# Patient Record
Sex: FEMALE | Race: BLACK OR AFRICAN AMERICAN | NOT HISPANIC OR LATINO | Employment: FULL TIME | URBAN - METROPOLITAN AREA
[De-identification: names, ages, dates, MRNs, and addresses within clinical notes are randomized per-mention and may not be internally consistent; named-entity substitution may affect disease eponyms.]

---

## 2020-10-14 ENCOUNTER — HOSPITAL ENCOUNTER (OUTPATIENT)
Dept: LAB | Facility: MEDICAL CENTER | Age: 50
End: 2020-10-14
Attending: FAMILY MEDICINE
Payer: COMMERCIAL

## 2020-10-14 LAB
ALBUMIN SERPL BCP-MCNC: 4.2 G/DL (ref 3.2–4.9)
ALBUMIN/GLOB SERPL: 1.3 G/DL
ALP SERPL-CCNC: 57 U/L (ref 30–99)
ALT SERPL-CCNC: 17 U/L (ref 2–50)
ANION GAP SERPL CALC-SCNC: 10 MMOL/L (ref 7–16)
AST SERPL-CCNC: 19 U/L (ref 12–45)
BILIRUB SERPL-MCNC: 0.2 MG/DL (ref 0.1–1.5)
BUN SERPL-MCNC: 15 MG/DL (ref 8–22)
CALCIUM SERPL-MCNC: 9.8 MG/DL (ref 8.5–10.5)
CHLORIDE SERPL-SCNC: 102 MMOL/L (ref 96–112)
CHOLEST SERPL-MCNC: 203 MG/DL (ref 100–199)
CO2 SERPL-SCNC: 26 MMOL/L (ref 20–33)
CREAT SERPL-MCNC: 0.78 MG/DL (ref 0.5–1.4)
FASTING STATUS PATIENT QL REPORTED: NORMAL
GLOBULIN SER CALC-MCNC: 3.2 G/DL (ref 1.9–3.5)
GLUCOSE SERPL-MCNC: 102 MG/DL (ref 65–99)
HDLC SERPL-MCNC: 40 MG/DL
LDLC SERPL CALC-MCNC: 142 MG/DL
POTASSIUM SERPL-SCNC: 3.6 MMOL/L (ref 3.6–5.5)
PROT SERPL-MCNC: 7.4 G/DL (ref 6–8.2)
SODIUM SERPL-SCNC: 138 MMOL/L (ref 135–145)
TRIGL SERPL-MCNC: 106 MG/DL (ref 0–149)

## 2020-10-14 PROCEDURE — 36415 COLL VENOUS BLD VENIPUNCTURE: CPT

## 2020-10-14 PROCEDURE — 80053 COMPREHEN METABOLIC PANEL: CPT

## 2020-10-14 PROCEDURE — 80061 LIPID PANEL: CPT

## 2020-10-20 ENCOUNTER — HOSPITAL ENCOUNTER (OUTPATIENT)
Dept: RADIOLOGY | Facility: MEDICAL CENTER | Age: 50
End: 2020-10-20
Payer: COMMERCIAL

## 2020-10-29 ENCOUNTER — HOSPITAL ENCOUNTER (OUTPATIENT)
Dept: RADIOLOGY | Facility: MEDICAL CENTER | Age: 50
End: 2020-10-29
Attending: FAMILY MEDICINE
Payer: COMMERCIAL

## 2020-10-29 DIAGNOSIS — Z12.31 VISIT FOR SCREENING MAMMOGRAM: ICD-10-CM

## 2020-10-29 PROCEDURE — 77067 SCR MAMMO BI INCL CAD: CPT

## 2021-02-17 ENCOUNTER — OFFICE VISIT (OUTPATIENT)
Dept: MEDICAL GROUP | Facility: PHYSICIAN GROUP | Age: 51
End: 2021-02-17
Payer: COMMERCIAL

## 2021-02-17 VITALS
RESPIRATION RATE: 20 BRPM | HEIGHT: 72 IN | SYSTOLIC BLOOD PRESSURE: 130 MMHG | TEMPERATURE: 98.6 F | HEART RATE: 80 BPM | OXYGEN SATURATION: 98 % | WEIGHT: 274.5 LBS | DIASTOLIC BLOOD PRESSURE: 70 MMHG | BODY MASS INDEX: 37.18 KG/M2

## 2021-02-17 DIAGNOSIS — Z12.11 COLON CANCER SCREENING: ICD-10-CM

## 2021-02-17 DIAGNOSIS — R12 HEARTBURN: ICD-10-CM

## 2021-02-17 DIAGNOSIS — E66.9 OBESITY (BMI 35.0-39.9 WITHOUT COMORBIDITY): ICD-10-CM

## 2021-02-17 DIAGNOSIS — I10 ESSENTIAL HYPERTENSION: ICD-10-CM

## 2021-02-17 DIAGNOSIS — F32.A DEPRESSION, UNSPECIFIED DEPRESSION TYPE: ICD-10-CM

## 2021-02-17 PROCEDURE — 99203 OFFICE O/P NEW LOW 30 MIN: CPT | Performed by: NURSE PRACTITIONER

## 2021-02-17 RX ORDER — TELMISARTAN AND HYDROCHLORTHIAZIDE 40; 12.5 MG/1; MG/1
1 TABLET ORAL DAILY
COMMUNITY
End: 2021-12-15 | Stop reason: SDUPTHER

## 2021-02-17 RX ORDER — FLUOXETINE HYDROCHLORIDE 20 MG/1
20 CAPSULE ORAL DAILY
COMMUNITY
End: 2021-12-15 | Stop reason: SDUPTHER

## 2021-02-17 ASSESSMENT — PATIENT HEALTH QUESTIONNAIRE - PHQ9: CLINICAL INTERPRETATION OF PHQ2 SCORE: 0

## 2021-02-17 NOTE — PROGRESS NOTES
"Subjective  Chief Complaint  Establish care to manage her chronic conditions    History of Present Illness  Luis Paredes is a 50 y.o. female. This patient is here today to establish care.  Her prior PCP was Dr. Concha Babb with [facility].    Essential hypertension  Currently taking Telmisartan-Hydrochlorothiazide 40-12.5 mg daily as directed.   Reports diet is \"okay\".   She is not following a low-salt diet.  She is not exercising regularly.  She is not taking baby aspirin daily.   She is not monitoring BP at home.   Denies symptoms low BP: light-headed, tunnel-vision, unusual fatigue, dizziness.  Denies symptoms high BP: pounding headache, visual changes, palpitations, flushed face.   Denies chest pain, shortness of breath, dyspnea on exertion.   Denies medicine side effects: unusual fatigue, slow heartbeat, foot/leg swelling, cough.    Heartburn  Chronic and ongoing. Takes OTC Omeprazole daily. She has a problem with red sauce or salsa. If she eats it she will also have to take a TUMS to help with her heartburn.    Depression  Chronic and stable. Mom passed away in her sleep in 2013. Was started on Fluoxetine 20 mg. She states the medication is working well for her. She has no desire to stop taking the medication. Denies any thoughts of suicide or homicide.    Obesity (BMI 35.0-39.9 without comorbidity)  Chronic and ongoing. She states she knows she needs to work better on her diet and exercise. Current diet and exercise is okay.    Past Medical History    Allergies: Penicillins  Past Medical History:   Diagnosis Date   • Hypertension      Past Surgical History:   Procedure Laterality Date   • ABDOMINAL HYSTERECTOMY TOTAL  2010     Current Outpatient Medications Ordered in Epic   Medication Sig Dispense Refill   • telmisartan-hydrochlorothiazide (MICARDIS HCT) 40-12.5 MG per tablet Take 1 tablet by mouth every day.     • FLUoxetine (PROZAC) 20 MG Cap Take 20 mg by mouth every day.     • OMEPRAZOLE PO " "Take  by mouth.     • Evening Primrose Oil 1000 MG Cap Take  by mouth.     • Zinc 50 MG Cap Take 50 mg by mouth every day.     • BLACK ELDERBERRY PO Take  by mouth.     • Selenium 200 MCG Tab Take 200 mcg by mouth every day.       No current [facility]-ordered facility-administered medications on file.     Family History:    Family History   Problem Relation Age of Onset   • Heart Disease Mother         Heart Attack   • Cancer Paternal Grandfather         Pancreatic   • Diabetes Neg Hx    • Stroke Neg Hx       Personal/Social History:    Social History     Tobacco Use   • Smoking status: Never Smoker   • Smokeless tobacco: Never Used   Substance Use Topics   • Alcohol use: Yes     Comment: Socially    • Drug use: Never     Social History     Social History Narrative   • Not on file      Review of Systems:     General: Negative for fever/chills and unexpected weight change.    Eyes:  Negative for vision changes, eye pain.   ENT:  Negative for hearing changes, ear pain, congestion, sore throat, and neck pain.    Respiratory:  Negative for cough and dyspnea.     Cardiovascular:  Negative for chest pain and palpitations.   Gastrointestinal:  Negative for nausea/vomiting, changes in bowel habits, and abdominal pain.    Genitourinary:  Negative for dysuria and hematuria.    Musculoskeletal:  Negative for myalgias.    Skin:  Negative for rash.    Neurological:  Negative for numbness/tingling and headaches.    Heme/Lymph:  Does not bruise/bleed easily.     Objective  Physical Exam:   /70 (BP Location: Right arm, Patient Position: Sitting, BP Cuff Size: Large adult)   Pulse 80   Temp 37 °C (98.6 °F) (Temporal)   Resp 20   Ht 1.867 m (6' 1.5\")   Wt 125 kg (274 lb 8 oz)   SpO2 98%  Body mass index is 35.72 kg/m².  General:  Alert and oriented.  Well appearing.  NAD.  Head:  Normocephalic.   Eyes:  Eyes conjunctiva clear lids without ptosis, pupils equal and reactive to light accommodation.    ENT: Ears normal shape and " contour, canals are clear bilaterally, tympanic membranes are benign.  Neck: Supple without JVD. No lymphadenopathy.  Pulmonary:  Normal effort.  Clear to ausculation without rales, ronchi, or wheezing.  Cardiovascular:  Regular rate and rhythm without murmur, rubs or gallop.  Radial pulses are intact and equal bilaterally.  Gastrointestinal: Abdomen soft, nontender, nondistended. Normal bowel sounds.   Musculoskeletal:  No extremity cyanosis, clubbing, or edema.  Skin:  Warm and dry.  No obvious lesions.  Neurologic: Grossly intact. Sensation intact.   Psych: Normal mood and affect. Alert and oriented x3. Judgment and insight is normal.      Assessment/Plan   1. Essential hypertension  Chronic and ongoing.  Continue to take Telmisartan-Hydrochlorothiazide 40-12.5 mg daily.  Educated on a healthy diet and exercise.    2. Heartburn  Chronic and stable.  Continue to take OTC Omeprazole.  Educated to avoid foods that may trigger her heartburn.    3. Depression, unspecified depression type  Chronic and stable.  Continue to take Fluoxetine 20 mg daily.    4. Obesity (BMI 35.0-39.9 without comorbidity)  Chronic and ongoing.  Educated on a healthy diet and exercise.  Discussed the [facility] [employer] [facility], she will think about it and let me know if she would like a referral placed.    5. Colon cancer screening  - REFERRAL TO GI FOR COLONOSCOPY      Health Maintenance: Records Requested    Return in about 8 weeks (around 4/13/2021) for Pap.    I spent a total of 36 minutes with record review, exam, and communication with the patient, communication with other providers, and documentation of this encounter.    Please note that this dictation was created using voice recognition software. I have made every reasonable attempt to correct obvious errors, but I expect that there are errors of grammar and possibly content that I did not discover before finalizing the note.    SOBIA Giles  [facility]

## 2021-02-17 NOTE — ASSESSMENT & PLAN NOTE
"Currently taking Telmisartan-Hydrochlorothiazide 40-12.5 mg daily as directed.   Reports diet is \"okay\".   She is not following a low-salt diet.  She is not exercising regularly.  She is not taking baby aspirin daily.   She is not monitoring BP at home.   Denies symptoms low BP: light-headed, tunnel-vision, unusual fatigue, dizziness.  Denies symptoms high BP: pounding headache, visual changes, palpitations, flushed face.   Denies chest pain, shortness of breath, dyspnea on exertion.   Denies medicine side effects: unusual fatigue, slow heartbeat, foot/leg swelling, cough.  "

## 2021-02-17 NOTE — ASSESSMENT & PLAN NOTE
Chronic and ongoing. She states she knows she needs to work better on her diet and exercise. Current diet and exercise is okay.

## 2021-02-17 NOTE — ASSESSMENT & PLAN NOTE
Chronic and stable. Takes OTC Omeprazole daily. She has a problem with red sauce or salsa. If she eats it she will also have to take a TUMS to help with her heartburn.

## 2021-02-17 NOTE — ASSESSMENT & PLAN NOTE
Chronic and stable. Mom passed away in her sleep in 2013. Was started on Fluoxetine 20 mg. She states the medication is working well for her. She has no desire to stop taking the medication. Denies any thoughts of suicide or homicide.

## 2021-06-07 ENCOUNTER — APPOINTMENT (OUTPATIENT)
Dept: MEDICAL GROUP | Facility: PHYSICIAN GROUP | Age: 51
End: 2021-06-07
Payer: COMMERCIAL

## 2021-10-19 DIAGNOSIS — I10 ESSENTIAL HYPERTENSION: ICD-10-CM

## 2021-10-19 DIAGNOSIS — Z00.00 WELLNESS EXAMINATION: ICD-10-CM

## 2021-10-19 NOTE — PROGRESS NOTES
1. Wellness examination  - Comp Metabolic Panel; Future  - Lipid Profile; Future  - VITAMIN D,25 HYDROXY; Future  - TSH WITH REFLEX TO FT4; Future    2. Essential hypertension  - Comp Metabolic Panel; Future

## 2021-12-09 ENCOUNTER — HOSPITAL ENCOUNTER (OUTPATIENT)
Dept: LAB | Facility: MEDICAL CENTER | Age: 51
End: 2021-12-09
Attending: NURSE PRACTITIONER
Payer: COMMERCIAL

## 2021-12-09 DIAGNOSIS — Z00.00 WELLNESS EXAMINATION: ICD-10-CM

## 2021-12-09 DIAGNOSIS — I10 ESSENTIAL HYPERTENSION: ICD-10-CM

## 2021-12-09 LAB
ALBUMIN SERPL BCP-MCNC: 4.4 G/DL (ref 3.2–4.9)
ALBUMIN/GLOB SERPL: 1.3 G/DL
ALP SERPL-CCNC: 71 U/L (ref 30–99)
ALT SERPL-CCNC: 14 U/L (ref 2–50)
ANION GAP SERPL CALC-SCNC: 15 MMOL/L (ref 7–16)
AST SERPL-CCNC: 17 U/L (ref 12–45)
BILIRUB SERPL-MCNC: 0.3 MG/DL (ref 0.1–1.5)
BUN SERPL-MCNC: 8 MG/DL (ref 8–22)
CALCIUM SERPL-MCNC: 9.4 MG/DL (ref 8.5–10.5)
CHLORIDE SERPL-SCNC: 105 MMOL/L (ref 96–112)
CHOLEST SERPL-MCNC: 235 MG/DL (ref 100–199)
CO2 SERPL-SCNC: 20 MMOL/L (ref 20–33)
CREAT SERPL-MCNC: 0.65 MG/DL (ref 0.5–1.4)
FASTING STATUS PATIENT QL REPORTED: NORMAL
GLOBULIN SER CALC-MCNC: 3.3 G/DL (ref 1.9–3.5)
GLUCOSE SERPL-MCNC: 93 MG/DL (ref 65–99)
HDLC SERPL-MCNC: 44 MG/DL
LDLC SERPL CALC-MCNC: 174 MG/DL
POTASSIUM SERPL-SCNC: 3.7 MMOL/L (ref 3.6–5.5)
PROT SERPL-MCNC: 7.7 G/DL (ref 6–8.2)
SODIUM SERPL-SCNC: 140 MMOL/L (ref 135–145)
TRIGL SERPL-MCNC: 85 MG/DL (ref 0–149)
TSH SERPL DL<=0.005 MIU/L-ACNC: 2.06 UIU/ML (ref 0.38–5.33)

## 2021-12-09 PROCEDURE — 84443 ASSAY THYROID STIM HORMONE: CPT

## 2021-12-09 PROCEDURE — 36415 COLL VENOUS BLD VENIPUNCTURE: CPT

## 2021-12-09 PROCEDURE — 80053 COMPREHEN METABOLIC PANEL: CPT

## 2021-12-09 PROCEDURE — 82306 VITAMIN D 25 HYDROXY: CPT

## 2021-12-09 PROCEDURE — 80061 LIPID PANEL: CPT

## 2021-12-13 LAB — 25(OH)D3 SERPL-MCNC: 10 NG/ML (ref 30–80)

## 2021-12-15 ENCOUNTER — OFFICE VISIT (OUTPATIENT)
Dept: MEDICAL GROUP | Facility: PHYSICIAN GROUP | Age: 51
End: 2021-12-15
Payer: COMMERCIAL

## 2021-12-15 VITALS
HEIGHT: 72 IN | OXYGEN SATURATION: 97 % | SYSTOLIC BLOOD PRESSURE: 122 MMHG | DIASTOLIC BLOOD PRESSURE: 78 MMHG | TEMPERATURE: 97.3 F | BODY MASS INDEX: 37.93 KG/M2 | HEART RATE: 84 BPM | WEIGHT: 280 LBS | RESPIRATION RATE: 20 BRPM

## 2021-12-15 DIAGNOSIS — E78.00 ELEVATED LDL CHOLESTEROL LEVEL: ICD-10-CM

## 2021-12-15 DIAGNOSIS — I10 ESSENTIAL HYPERTENSION: ICD-10-CM

## 2021-12-15 DIAGNOSIS — E55.9 VITAMIN D DEFICIENCY: ICD-10-CM

## 2021-12-15 DIAGNOSIS — F32.A DEPRESSION, UNSPECIFIED DEPRESSION TYPE: ICD-10-CM

## 2021-12-15 DIAGNOSIS — Z12.31 ENCOUNTER FOR SCREENING MAMMOGRAM FOR BREAST CANCER: ICD-10-CM

## 2021-12-15 DIAGNOSIS — Z23 NEED FOR VACCINATION: ICD-10-CM

## 2021-12-15 PROCEDURE — 90686 IIV4 VACC NO PRSV 0.5 ML IM: CPT | Performed by: NURSE PRACTITIONER

## 2021-12-15 PROCEDURE — 99214 OFFICE O/P EST MOD 30 MIN: CPT | Mod: 25 | Performed by: NURSE PRACTITIONER

## 2021-12-15 PROCEDURE — 90471 IMMUNIZATION ADMIN: CPT | Performed by: NURSE PRACTITIONER

## 2021-12-15 RX ORDER — TELMISARTAN AND HYDROCHLORTHIAZIDE 40; 12.5 MG/1; MG/1
1 TABLET ORAL DAILY
Qty: 90 TABLET | Refills: 3 | Status: SHIPPED | OUTPATIENT
Start: 2021-12-15 | End: 2022-05-05 | Stop reason: SDUPTHER

## 2021-12-15 RX ORDER — ERGOCALCIFEROL 1.25 MG/1
50000 CAPSULE ORAL
Qty: 12 CAPSULE | Refills: 3 | Status: SHIPPED | OUTPATIENT
Start: 2021-12-15

## 2021-12-15 RX ORDER — FLUOXETINE HYDROCHLORIDE 20 MG/1
20 CAPSULE ORAL DAILY
Qty: 90 CAPSULE | Refills: 3 | Status: SHIPPED | OUTPATIENT
Start: 2021-12-15

## 2021-12-15 NOTE — PROGRESS NOTES
"Subjective  Chief Complaint  Lab Results and Medication Refill    History of Present Illness  Luis Paredes is a 51 y.o. female. This established patient is here today to review lab results and refill medications.    Depression  Chronic and stable. Currently taking Fluoxetine 20 mg daily. Denies any thoughts of suicide or homicide. She is requesting a refill of her medication at this time.    Essential hypertension  Currently taking Telmisartan-Hydrochlorothiazide 40-12.5 mg daily as directed.   Reports diet is \"okay\".   She is not following a low-salt diet.  She is not exercising regularly.  She is not taking baby aspirin daily.   She is not monitoring BP at home.   Denies symptoms low BP: light-headed, tunnel-vision, unusual fatigue, dizziness.  Denies symptoms high BP: pounding headache, visual changes, palpitations, flushed face.   Denies chest pain, shortness of breath, dyspnea on exertion.   Denies medicine side effects: unusual fatigue, slow heartbeat, foot/leg swelling, cough.    Vitamin D deficiency  New diagnosis.  Reports fatigue.  Denies any muscle weakness.  No history of CKD.  Reports having a good diet, including dairy products.  No history of IBD's, celiac, CF, or surgeries causing malabsorption.  Patient is obese.  Is not taking vitamin d and/or calcium supplement.     12/9/2021 11:23   25-Hydroxy   Vitamin D 25 10 (L)       Elevated LDL cholesterol level  Chronic, ongoing.  Not taking any cholesterol lowering medications.  10-year cardiac risk ASCVD score: 6.4%  Reports diet is \"okay\".   She is not following a low-cholesterol diet.  She is not exercising regularly.  She is not taking ASA daily.  She denies dizziness, claudication, or chest pain.  Due for updated lab work June 2022.     10/14/2020 08:20 12/9/2021 11:23   Cholesterol,Tot 203 (H) 235 (H)   Triglycerides 106 85   HDL 40 44    (H) 174 (H)     Past Medical History    Allergies: Penicillins  Past Medical History: "   Diagnosis Date   • Hypertension      Past Surgical History:   Procedure Laterality Date   • ABDOMINAL HYSTERECTOMY TOTAL  2010     Current Outpatient Medications Ordered in Epic   Medication Sig Dispense Refill   • vitamin D2, Ergocalciferol, (DRISDOL) 1.25 MG (08305 UT) Cap capsule Take 1 Capsule by mouth every 7 days. 12 Capsule 3   • telmisartan-hydrochlorothiazide (MICARDIS HCT) 40-12.5 MG per tablet Take 1 Tablet by mouth every day. 90 Tablet 3   • FLUoxetine (PROZAC) 20 MG Cap Take 1 Capsule by mouth every day. 90 Capsule 3   • SUPREP BOWEL PREP KIT 17.5-3.13-1.6 GM/177ML Solution      • OMEPRAZOLE PO Take  by mouth.     • Evening Primrose Oil 1000 MG Cap Take  by mouth.     • Zinc 50 MG Cap Take 50 mg by mouth every day.     • BLACK ELDERBERRY PO Take  by mouth.     • Selenium 200 MCG Tab Take 200 mcg by mouth every day.       No current [facility]-ordered facility-administered medications on file.     Family History:    Family History   Problem Relation Age of Onset   • Heart Disease Mother         Heart Attack   • Cancer Paternal Grandfather         Pancreatic   • Diabetes Neg Hx    • Stroke Neg Hx       Personal/Social History:    Social History     Tobacco Use   • Smoking status: Never Smoker   • Smokeless tobacco: Never Used   Vaping Use   • Vaping Use: Never used   Substance Use Topics   • Alcohol use: Yes     Comment: Socially    • Drug use: Never     Social History     Social History Narrative   • Not on file      Review of Systems:   General: Negative for fever/chills and unexpected weight change.   Respiratory:  Negative for cough and dyspnea.    Cardiovascular:  Negative for chest pain and palpitations.  Gastrointestinal:  Negative for nausea/vomiting, changes in bowel habits, and abdominal pain.   Skin:  Negative for rash.   Neurological:  Negative for numbness/tingling and headaches.     Objective  Physical Exam:   /78 (BP Location: Left arm, Patient Position: Sitting, BP Cuff Size: Large  "adult)   Pulse 84   Temp 36.3 °C (97.3 °F) (Temporal)   Resp 20   Ht 1.867 m (6' 1.5\")   Wt (!) 127 kg (280 lb)   SpO2 97%  Body mass index is 36.44 kg/m².  General:  Alert and oriented.  Well appearing.  NAD  Neck: Supple without JVD. No lymphadenopathy.  Pulmonary:  Normal effort.  Clear to ausculation without rales, ronchi, or wheezing.  Cardiovascular:  Regular rate and rhythm without murmur, rubs or gallop.   Skin:  Warm and dry.  No obvious lesions.  Musculoskeletal:  No extremity cyanosis, clubbing, or edema.      Diagnostic Testing/Findings:  Labs:    12/9/2021 11:23   Sodium 140   Potassium 3.7   Chloride 105   Co2 20   Anion Gap 15.0   Glucose 93   Bun 8   Creatinine 0.65   GFR If  >60   GFR If Non African American >60   Calcium 9.4   AST(SGOT) 17   ALT(SGPT) 14   Alkaline Phosphatase 71   Total Bilirubin 0.3   Albumin 4.4   Total Protein 7.7   Globulin 3.3   A-G Ratio 1.3   Fasting Status Fasting   Cholesterol,Tot 235 (H)   Triglycerides 85   HDL 44    (H)   25-Hydroxy   Vitamin D 25 10 (L)   TSH 2.060       Assessment/Plan  1. Vitamin D deficiency  New diagnosis.  Discussed Vitamin D 27703 units, take once a week.  Discussed taking Calcium and Magnesium supplement.  - vitamin D2, Ergocalciferol, (DRISDOL) 1.25 MG (10508 UT) Cap capsule; Take 1 Capsule by mouth every 7 days.  Dispense: 12 Capsule; Refill: 3    2. Elevated LDL cholesterol level  Chronic and ongoing.  Educated on a healthy diet and exercise.  Due for updated labs in 6 months.    3. Essential hypertension  Chronic and ongoing.  Continue to take Telmisartan-Hydrochlorothiazide 40-12.5 mg every day.  Educated on a healthy diet and exercise.  - telmisartan-hydrochlorothiazide (MICARDIS HCT) 40-12.5 MG per tablet; Take 1 Tablet by mouth every day.  Dispense: 90 Tablet; Refill: 3    4. Depression, unspecified depression type  Chronic and stable.  Continue to take Fluoxetine 20 mg daily.  - FLUoxetine (PROZAC) 20 MG " Cap; Take 1 Capsule by mouth every day.  Dispense: 90 Capsule; Refill: 3    5. Encounter for screening mammogram for breast cancer  - MA-SCREENING MAMMO BILAT W/TOMOSYNTHESIS W/CAD; Future    6. Need for vaccination  - INFLUENZA VACCINE QUAD INJ (PF)      Health Maintenance: Discussed with the patient.    Return in about 2 months (around 2/15/2022), or if symptoms worsen or fail to improve, for Pap.    I have placed the above orders and discussed them with an approved delegating provider.  The MA is performing the above orders under the direction of Dr. Jeffrey Prescott MD/DO.    Please note that this dictation was created using voice recognition software. I have made every reasonable attempt to correct obvious errors, but I expect that there are errors of grammar and possibly content that I did not discover before finalizing the note.    SOBIA Giles  [facility]

## 2021-12-15 NOTE — ASSESSMENT & PLAN NOTE
New diagnosis.  Reports fatigue.  Denies any muscle weakness.  No history of CKD.  Reports having a good diet, including dairy products.  No history of IBD's, celiac, CF, or surgeries causing malabsorption.  Patient is obese.  Is not taking vitamin d and/or calcium supplement.     12/9/2021 11:23   25-Hydroxy   Vitamin D 25 10 (L)

## 2021-12-15 NOTE — ASSESSMENT & PLAN NOTE
"Chronic, ongoing.  Not taking any cholesterol lowering medications.  10-year cardiac risk ASCVD score: 6.4%  Reports diet is \"okay\".   She is not following a low-cholesterol diet.  She is not exercising regularly.  She is not taking ASA daily.  She denies dizziness, claudication, or chest pain.  Due for updated lab work June 2022.     10/14/2020 08:20 12/9/2021 11:23   Cholesterol,Tot 203 (H) 235 (H)   Triglycerides 106 85   HDL 40 44    (H) 174 (H)     "

## 2021-12-15 NOTE — ASSESSMENT & PLAN NOTE
Chronic and stable. Currently taking Fluoxetine 20 mg daily. Denies any thoughts of suicide or homicide. She is requesting a refill of her medication at this time.

## 2022-01-24 ENCOUNTER — HOSPITAL ENCOUNTER (OUTPATIENT)
Dept: RADIOLOGY | Facility: MEDICAL CENTER | Age: 52
End: 2022-01-24
Attending: NURSE PRACTITIONER
Payer: COMMERCIAL

## 2022-01-24 DIAGNOSIS — Z12.31 ENCOUNTER FOR SCREENING MAMMOGRAM FOR BREAST CANCER: ICD-10-CM

## 2022-01-24 PROCEDURE — 77063 BREAST TOMOSYNTHESIS BI: CPT

## 2022-05-05 DIAGNOSIS — I10 ESSENTIAL HYPERTENSION: ICD-10-CM

## 2022-05-05 RX ORDER — TELMISARTAN AND HYDROCHLORTHIAZIDE 40; 12.5 MG/1; MG/1
2 TABLET ORAL DAILY
Qty: 180 TABLET | Refills: 2 | Status: SHIPPED | OUTPATIENT
Start: 2022-05-05

## 2022-05-05 NOTE — PROGRESS NOTES
1. Essential hypertension  - telmisartan-hydrochlorothiazide (MICARDIS HCT) 40-12.5 MG per tablet; Take 2 Tablets by mouth every day.  Dispense: 180 Tablet; Refill: 2

## 2022-05-20 ENCOUNTER — HOSPITAL ENCOUNTER (OUTPATIENT)
Dept: RADIOLOGY | Facility: MEDICAL CENTER | Age: 52
End: 2022-05-20
Attending: NURSE PRACTITIONER
Payer: COMMERCIAL

## 2022-05-20 ENCOUNTER — OFFICE VISIT (OUTPATIENT)
Dept: URGENT CARE | Facility: PHYSICIAN GROUP | Age: 52
End: 2022-05-20
Payer: COMMERCIAL

## 2022-05-20 VITALS
OXYGEN SATURATION: 98 % | SYSTOLIC BLOOD PRESSURE: 100 MMHG | HEART RATE: 73 BPM | HEIGHT: 72 IN | RESPIRATION RATE: 18 BRPM | WEIGHT: 283 LBS | TEMPERATURE: 97.6 F | BODY MASS INDEX: 38.33 KG/M2 | DIASTOLIC BLOOD PRESSURE: 64 MMHG

## 2022-05-20 DIAGNOSIS — S69.91XA INJURY OF RIGHT THUMB, INITIAL ENCOUNTER: ICD-10-CM

## 2022-05-20 DIAGNOSIS — S62.619A CLOSED AVULSION FRACTURE OF PROXIMAL PHALANX OF FINGER, INITIAL ENCOUNTER: ICD-10-CM

## 2022-05-20 PROCEDURE — 99213 OFFICE O/P EST LOW 20 MIN: CPT | Performed by: NURSE PRACTITIONER

## 2022-05-20 PROCEDURE — 73140 X-RAY EXAM OF FINGER(S): CPT | Mod: RT

## 2022-05-20 RX ORDER — PHENOL 1.4 %
AEROSOL, SPRAY (ML) MUCOUS MEMBRANE
COMMUNITY

## 2022-05-20 ASSESSMENT — ENCOUNTER SYMPTOMS
CHILLS: 0
MYALGIAS: 1
FEVER: 0
TINGLING: 0
BRUISES/BLEEDS EASILY: 0
FALLS: 1
SENSORY CHANGE: 0
WEAKNESS: 0

## 2022-05-20 NOTE — PROGRESS NOTES
"Subjective     Luis Paredes is a 52 y.o. female who presents with Fall (Pt sts she fell outside her yard and hurt her right thumb. Onset 2 weeks. )            HPI  States cleaning up yard 2 weeks ago, slipped on loose rock and fell on right hand. Right thumb swelling and \"catching\" of joint. No erythema, bruising or open wound. States thumb looks deformed. No numbness/tingling, weakness of hand  but unbale to close fist. Left handed.     PMH:  has a past medical history of Hypertension.  MEDS:   Current Outpatient Medications:   •  Calcium Carbonate (CALCIUM 600) 600 MG Tab, Take  by mouth., Disp: , Rfl:   •  Magnesium Gluconate (MAGNESIUM 27 PO), Take  by mouth., Disp: , Rfl:   •  telmisartan-hydrochlorothiazide (MICARDIS HCT) 40-12.5 MG per tablet, Take 2 Tablets by mouth every day., Disp: 180 Tablet, Rfl: 2  •  vitamin D2, Ergocalciferol, (DRISDOL) 1.25 MG (73778 UT) Cap capsule, Take 1 Capsule by mouth every 7 days., Disp: 12 Capsule, Rfl: 3  •  FLUoxetine (PROZAC) 20 MG Cap, Take 1 Capsule by mouth every day., Disp: 90 Capsule, Rfl: 3  •  OMEPRAZOLE PO, Take 15 mg by mouth., Disp: , Rfl:   •  Evening Primrose Oil 1000 MG Cap, Take  by mouth., Disp: , Rfl:   •  Zinc 50 MG Cap, Take 50 mg by mouth every day., Disp: , Rfl:   ALLERGIES:   Allergies   Allergen Reactions   • Penicillins      Was told she was allergic      SURGHX:   Past Surgical History:   Procedure Laterality Date   • ABDOMINAL HYSTERECTOMY TOTAL  2010     SOCHX:  reports that she has never smoked. She has never used smokeless tobacco. She reports current alcohol use. She reports that she does not use drugs.  FH: Family history was reviewed, no pertinent findings to report    Review of Systems   Constitutional: Negative for chills, fever and malaise/fatigue.   Musculoskeletal: Positive for falls, joint pain and myalgias.   Skin: Negative for itching and rash.   Neurological: Negative for tingling, sensory change and weakness. " "  Endo/Heme/Allergies: Does not bruise/bleed easily.   All other systems reviewed and are negative.             Objective     /64 (BP Location: Right arm, Patient Position: Sitting, BP Cuff Size: Large adult)   Pulse 73   Temp 36.4 °C (97.6 °F) (Temporal)   Resp 18   Ht 1.867 m (6' 1.5\")   Wt (!) 128 kg (283 lb)   LMP 02/01/2010   SpO2 98%   BMI 36.83 kg/m²      Physical Exam  Vitals reviewed.   Constitutional:       General: She is awake. She is not in acute distress.     Appearance: Normal appearance. She is well-developed. She is not ill-appearing, toxic-appearing or diaphoretic.   HENT:      Head: Normocephalic.   Cardiovascular:      Rate and Rhythm: Normal rate.   Pulmonary:      Effort: Pulmonary effort is normal.   Musculoskeletal:      Right hand: Swelling, deformity, tenderness and bony tenderness present. No lacerations. Decreased range of motion. Normal strength. Normal sensation. There is no disruption of two-point discrimination. Normal capillary refill. Normal pulse.      Comments: Mild swelling at base of right thumb, tenderness to touch along 1st and 2nd phalangeal joints, mild curvature at 1st phalangeal joint. Unable to close fist with thumb. Med assist applied finger splint.   Skin:     General: Skin is warm and dry.      Findings: Signs of injury present. No abrasion, bruising, ecchymosis, erythema, rash or wound.   Neurological:      Mental Status: She is alert and oriented to person, place, and time.   Psychiatric:         Attention and Perception: Attention normal.         Mood and Affect: Mood normal.         Speech: Speech normal.         Behavior: Behavior normal. Behavior is cooperative.                IMPRESSION:     1.  There is a bony density projecting adjacent to the trapezium and 1st CMC joint, possibly a subacute avulsion fracture. Alternatively this could represent an area of prior trauma with subsequent mild osteoarthritis of the 1st CMC joint.       "       Assessment & Plan        1. Injury of right thumb, initial encounter    - DX-FINGER(S) 2+ RIGHT; Future  - Referral to Sports Medicine    2. Closed avulsion fracture of proximal phalanx of finger, initial encounter    - Referral to Sports Medicine  -May use over the counter NSAID as needed for pain/swelling  -May use cool compresses for swelling as needed  -May utilize RICE method as needed, use finger splint until seen by Sports Med  -Avoid excessive weight bearing to avoid further injury  -May apply topical analgesics as needed   -Perform proper body mechanics with lift/carry, push/pull  -Monitor for deformity, numbness/tingling in toes, decreased range of motion with hand use- need re-evaluation  Follow up with Sports Med

## 2022-05-26 ENCOUNTER — OFFICE VISIT (OUTPATIENT)
Dept: SPORTS MEDICINE | Facility: CLINIC | Age: 52
End: 2022-05-26
Payer: COMMERCIAL

## 2022-05-26 VITALS
HEART RATE: 102 BPM | OXYGEN SATURATION: 97 % | BODY MASS INDEX: 38.33 KG/M2 | RESPIRATION RATE: 16 BRPM | SYSTOLIC BLOOD PRESSURE: 122 MMHG | TEMPERATURE: 98 F | WEIGHT: 283 LBS | DIASTOLIC BLOOD PRESSURE: 78 MMHG | HEIGHT: 72 IN

## 2022-05-26 DIAGNOSIS — M65.311 TRIGGER THUMB, RIGHT THUMB: ICD-10-CM

## 2022-05-26 PROCEDURE — 20550 NJX 1 TENDON SHEATH/LIGAMENT: CPT | Mod: RT | Performed by: FAMILY MEDICINE

## 2022-05-26 PROCEDURE — 99213 OFFICE O/P EST LOW 20 MIN: CPT | Mod: 25 | Performed by: FAMILY MEDICINE

## 2022-05-26 RX ORDER — TRIAMCINOLONE ACETONIDE 40 MG/ML
20 INJECTION, SUSPENSION INTRA-ARTICULAR; INTRAMUSCULAR ONCE
Status: COMPLETED | OUTPATIENT
Start: 2022-05-26 | End: 2022-05-26

## 2022-05-26 RX ADMIN — TRIAMCINOLONE ACETONIDE 20 MG: 40 INJECTION, SUSPENSION INTRA-ARTICULAR; INTRAMUSCULAR at 16:54

## 2022-05-26 ASSESSMENT — ENCOUNTER SYMPTOMS
NAUSEA: 0
DIZZINESS: 0
CHILLS: 0
SHORTNESS OF BREATH: 0
FEVER: 0
VOMITING: 0

## 2022-05-26 NOTE — PROCEDURES
PROCEDURE NOTE:  right HAND corticosteroid injection  Risks and benefits discussed  Informed consent obtained  Hand prepped in sterile fashion utilizing  20 mg of Kenalog and 0.5 cc of 2% lidocaine injected into the RIGHT thumb A1 pulley  Vapocoolant spray was utilized  Patient tolerated the procedure well  Postprocedure care and red flags discussed

## 2022-05-26 NOTE — PROGRESS NOTES
Chief Complaint   Patient presents with   • Finger Pain     Referral from UC/ R thumb pain      Subjective     Referred by ELLIOTT Sen  for evaluation of RIGHT thumb pain (left-handed dominant)  Date of injury, Friday, April 28, 2022 or possibly before that  RIGHT hand FOOSH at home outside of her house after sustaining an inversion injury of the ankle falling onto her right outstretched hand  Triggering started about 2 weeks after the fall (on May 13, 2022)  Her thumb was initially triggering painlessly, then eventually became more severe and more painful  Pain is sharp  Predominantly at the volar aspect of the hand in the region of the A1 pulley of the RIGHT thumb  No radiation  Improved with immobilization, temporarily because she feels like she needs to release it every now and then to move the finger  No night symptoms  Denies any prior injuries or issues with the RIGHT hand or wrist  Has tried Aleve helped initially  Aspercreme/topical pain medication also helped     at [facility], [employer] work and interacting with [employer] members  Activities include traveling    Review of Systems   Constitutional: Negative for chills and fever.   Respiratory: Negative for shortness of breath.    Cardiovascular: Negative for chest pain.   Gastrointestinal: Negative for nausea and vomiting.   Neurological: Negative for dizziness.     PMH:  has a past medical history of Hypertension.  MEDS:   Current Outpatient Medications:   •  Calcium Carbonate (CALCIUM 600) 600 MG Tab, Take  by mouth., Disp: , Rfl:   •  Magnesium Gluconate (MAGNESIUM 27 PO), Take  by mouth., Disp: , Rfl:   •  telmisartan-hydrochlorothiazide (MICARDIS HCT) 40-12.5 MG per tablet, Take 2 Tablets by mouth every day., Disp: 180 Tablet, Rfl: 2  •  vitamin D2, Ergocalciferol, (DRISDOL) 1.25 MG (44523 UT) Cap capsule, Take 1 Capsule by mouth every 7 days., Disp: 12 Capsule, Rfl: 3  •  FLUoxetine (PROZAC) 20 MG Cap, Take 1 Capsule by  "mouth every day., Disp: 90 Capsule, Rfl: 3  •  OMEPRAZOLE PO, Take 15 mg by mouth., Disp: , Rfl:   •  Evening Primrose Oil 1000 MG Cap, Take  by mouth., Disp: , Rfl:   •  Zinc 50 MG Cap, Take 50 mg by mouth every day., Disp: , Rfl:   ALLERGIES:   Allergies   Allergen Reactions   • Penicillins      Was told she was allergic      SURGHX:   Past Surgical History:   Procedure Laterality Date   • ABDOMINAL HYSTERECTOMY TOTAL  2010     SOCHX:  reports that she has never smoked. She has never used smokeless tobacco. She reports current alcohol use. She reports that she does not use drugs.  FH: Family history was reviewed, no pertinent findings to report    Objective   /78 (BP Location: Left arm, Patient Position: Sitting, BP Cuff Size: Large adult)   Pulse (!) 102   Temp 36.7 °C (98 °F) (Temporal)   Resp 16   Ht 1.867 m (6' 1.5\")   Wt (!) 128 kg (283 lb)   LMP 02/01/2010   SpO2 97%   BMI 36.83 kg/m²     Hand exam    NAD  Alert and oriented    BILATERAL WRIST exam  Range of motion intact  No tenderness along scaphoid, TFCC insertion, distal radius or distal ulna  Tinel's testing is NEGATIVE  The hand is otherwise neurovascularly intact    BILATERAL hand exam  NO swelling  NO deformity  POSITIVE triggering and tenderness along the A1 pulley of the RIGHT thumb  Range of motion of all MCP, DIP and PIP joints NORMAL  Pinky opposition NORMAL  Grind test is NEGATIVE  Collateral ligament testing is NORMAL    1. Trigger thumb, right thumb  triamcinolone acetonide (KENALOG-40) injection 20 mg     Date of injury, Friday, April 28, 2022 or possibly before that  RIGHT hand FOOSH at home outside of her house after sustaining an inversion injury of the ankle falling onto her right outstretched hand    To be clear, patient is NONTENDER at the region of the base of the thumb at the region of the CMC joint  Calcification found there is NONTENDER is likely from prior/remote injury    Her symptoms seem to be coming predominantly " from the RIGHT trigger thumb/A1 pulley region with tenderness at the A1 pulley    RIGHT thumb A1 pulley corticosteroid injection performed the office TODAY (May 26, 2022)     Return in about 4 weeks (around 6/23/2022).  To see how she is doing after RIGHT thumb A1 pulley corticosteroid injection          5/20/2022 3:22 PM     HISTORY/REASON FOR EXAM:  Fall 2 weeks ago with right thumb pain.        TECHNIQUE/EXAM DESCRIPTION AND NUMBER OF VIEWS:  3 views of the RIGHT fingers.     COMPARISON: None     FINDINGS:  There is a triangular-shaped bony density projecting adjacent to the radial side of the trapezium and 1st CMC joint.     There is also mild degenerative change of the right 1st CMC joint with slight radial subluxation and joint space narrowing with minimal sclerosis.     The 1st digit phalanges are intact.     Remainder of the right hand is unremarkable.     IMPRESSION:     1.  There is a bony density projecting adjacent to the trapezium and 1st CMC joint, possibly a subacute avulsion fracture. Alternatively this could represent an area of prior trauma with subsequent mild osteoarthritis of the 1st CMC joint.             Exam Ended: 05/20/22  3:31 PM Last Resulted: 05/20/22  3:36 PM           Interpreted in the office today with the patient     Thank you ELLIOTT Sen for allowing me to participate in caring for your patient.

## 2022-06-07 DIAGNOSIS — E78.00 ELEVATED LDL CHOLESTEROL LEVEL: ICD-10-CM

## 2022-06-23 ENCOUNTER — OFFICE VISIT (OUTPATIENT)
Dept: SPORTS MEDICINE | Facility: CLINIC | Age: 52
End: 2022-06-23
Payer: COMMERCIAL

## 2022-06-23 VITALS
HEART RATE: 72 BPM | DIASTOLIC BLOOD PRESSURE: 76 MMHG | RESPIRATION RATE: 18 BRPM | SYSTOLIC BLOOD PRESSURE: 122 MMHG | OXYGEN SATURATION: 96 % | TEMPERATURE: 98.4 F | BODY MASS INDEX: 38.33 KG/M2 | WEIGHT: 283 LBS | HEIGHT: 72 IN

## 2022-06-23 DIAGNOSIS — M65.311 TRIGGER THUMB, RIGHT THUMB: ICD-10-CM

## 2022-06-23 PROCEDURE — 99212 OFFICE O/P EST SF 10 MIN: CPT | Performed by: FAMILY MEDICINE

## 2022-06-23 NOTE — PROGRESS NOTES
"Chief Complaint   Patient presents with   • Finger Pain     F/V R thumb pain      Subjective     Referred by ELLIOTT Sen  for evaluation of RIGHT thumb pain (left-handed dominant)  Date of injury, Friday, April 28, 2022 or possibly before that  RIGHT hand FOOSH at home outside of her house after sustaining an inversion injury of the ankle falling onto her right outstretched hand  Triggering started about 2 weeks after the fall (on May 13, 2022)  Her thumb was initially triggering painlessly, then eventually became more severe and more painful  Pain is sharp  Predominantly at the volar aspect of the hand in the region of the A1 pulley of the RIGHT thumb  No radiation  Improved with immobilization, temporarily because she feels like she needs to release it every now and then to move the finger  No night symptoms  Denies any prior injuries or issues with the RIGHT hand or wrist  Has tried Aleve helped initially  Aspercreme/topical pain medication also helped     at [facility], 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 Trigger thumb, right thumb         Date of injury, Friday, April 28, 2022 or possibly before that  RIGHT hand FOOSH at home outside of her house after sustaining an inversion injury of the ankle falling onto her right outstretched hand    Her symptoms seem to be coming predominantly from the RIGHT trigger thumb/A1 pulley region with " tenderness at the A1 pulley    RIGHT thumb A1 pulley corticosteroid injection performed (May 26, 2022) HELPED     At this point, she is pleased with the results  She is still having some mild clinical triggering on exam with is painless    Follow-up as needed.  We can consider repeat corticosteroid injection of the RIGHT thumb A1 pulley if symptoms recur          5/20/2022 3:22 PM     HISTORY/REASON FOR EXAM:  Fall 2 weeks ago with right thumb pain.        TECHNIQUE/EXAM DESCRIPTION AND NUMBER OF VIEWS:  3 views of the RIGHT fingers.     COMPARISON: None     FINDINGS:  There is a triangular-shaped bony density projecting adjacent to the radial side of the trapezium and 1st CMC joint.     There is also mild degenerative change of the right 1st CMC joint with slight radial subluxation and joint space narrowing with minimal sclerosis.     The 1st digit phalanges are intact.     Remainder of the right hand is unremarkable.     IMPRESSION:     1.  There is a bony density projecting adjacent to the trapezium and 1st CMC joint, possibly a subacute avulsion fracture. Alternatively this could represent an area of prior trauma with subsequent mild osteoarthritis of the 1st CMC joint.             Exam Ended: 05/20/22  3:31 PM Last Resulted: 05/20/22  3:36 PM           Thank you NAIMA Sen. for allowing me to participate in caring for your patient.

## 2022-06-23 NOTE — Clinical Note
Chris Rivas, We saw Luis back for her trigger thumb.  Fortunately, the corticosteroid injection we provided for her trigger thumb HELPED. We will have her follow-up on an as-needed basis at this point.   Hope you are well! L

## 2022-08-19 ENCOUNTER — HOSPITAL ENCOUNTER (OUTPATIENT)
Facility: MEDICAL CENTER | Age: 52
End: 2022-08-19
Attending: OBSTETRICS & GYNECOLOGY
Payer: COMMERCIAL

## 2022-08-19 PROCEDURE — 87624 HPV HI-RISK TYP POOLED RSLT: CPT

## 2022-08-19 PROCEDURE — 88175 CYTOPATH C/V AUTO FLUID REDO: CPT

## 2022-08-24 LAB
CYTOLOGY REG CYTOL: NORMAL
HPV HR 12 DNA CVX QL NAA+PROBE: NEGATIVE
HPV16 DNA SPEC QL NAA+PROBE: NEGATIVE
HPV18 DNA SPEC QL NAA+PROBE: NEGATIVE
SPECIMEN SOURCE: NORMAL

## 2022-09-15 ENCOUNTER — HOSPITAL ENCOUNTER (OUTPATIENT)
Dept: LAB | Facility: MEDICAL CENTER | Age: 52
End: 2022-09-15
Attending: FAMILY MEDICINE
Payer: COMMERCIAL

## 2022-09-15 LAB
25(OH)D3 SERPL-MCNC: 42 NG/ML (ref 30–100)
ALBUMIN SERPL BCP-MCNC: 4.1 G/DL (ref 3.2–4.9)
ALBUMIN/GLOB SERPL: 1.4 G/DL
ALP SERPL-CCNC: 61 U/L (ref 30–99)
ALT SERPL-CCNC: 12 U/L (ref 2–50)
ANION GAP SERPL CALC-SCNC: 10 MMOL/L (ref 7–16)
AST SERPL-CCNC: 15 U/L (ref 12–45)
BASOPHILS # BLD AUTO: 1 % (ref 0–1.8)
BASOPHILS # BLD: 0.06 K/UL (ref 0–0.12)
BILIRUB SERPL-MCNC: 0.2 MG/DL (ref 0.1–1.5)
BUN SERPL-MCNC: 12 MG/DL (ref 8–22)
CALCIUM SERPL-MCNC: 9.6 MG/DL (ref 8.5–10.5)
CHLORIDE SERPL-SCNC: 103 MMOL/L (ref 96–112)
CHOLEST SERPL-MCNC: 192 MG/DL (ref 100–199)
CO2 SERPL-SCNC: 25 MMOL/L (ref 20–33)
CREAT SERPL-MCNC: 0.66 MG/DL (ref 0.5–1.4)
EOSINOPHIL # BLD AUTO: 0.13 K/UL (ref 0–0.51)
EOSINOPHIL NFR BLD: 2.3 % (ref 0–6.9)
ERYTHROCYTE [DISTWIDTH] IN BLOOD BY AUTOMATED COUNT: 45.8 FL (ref 35.9–50)
EST. AVERAGE GLUCOSE BLD GHB EST-MCNC: 126 MG/DL
FASTING STATUS PATIENT QL REPORTED: NORMAL
GFR SERPLBLD CREATININE-BSD FMLA CKD-EPI: 105 ML/MIN/1.73 M 2
GLOBULIN SER CALC-MCNC: 2.9 G/DL (ref 1.9–3.5)
GLUCOSE SERPL-MCNC: 107 MG/DL (ref 65–99)
HBA1C MFR BLD: 6 % (ref 4–5.6)
HCT VFR BLD AUTO: 39.6 % (ref 37–47)
HDLC SERPL-MCNC: 40 MG/DL
HGB BLD-MCNC: 13 G/DL (ref 12–16)
IMM GRANULOCYTES # BLD AUTO: 0.02 K/UL (ref 0–0.11)
IMM GRANULOCYTES NFR BLD AUTO: 0.3 % (ref 0–0.9)
LDLC SERPL CALC-MCNC: 126 MG/DL
LYMPHOCYTES # BLD AUTO: 2.51 K/UL (ref 1–4.8)
LYMPHOCYTES NFR BLD: 43.8 % (ref 22–41)
MCH RBC QN AUTO: 28.3 PG (ref 27–33)
MCHC RBC AUTO-ENTMCNC: 32.8 G/DL (ref 33.6–35)
MCV RBC AUTO: 86.3 FL (ref 81.4–97.8)
MONOCYTES # BLD AUTO: 0.38 K/UL (ref 0–0.85)
MONOCYTES NFR BLD AUTO: 6.6 % (ref 0–13.4)
NEUTROPHILS # BLD AUTO: 2.63 K/UL (ref 2–7.15)
NEUTROPHILS NFR BLD: 46 % (ref 44–72)
NRBC # BLD AUTO: 0 K/UL
NRBC BLD-RTO: 0 /100 WBC
PLATELET # BLD AUTO: 266 K/UL (ref 164–446)
PMV BLD AUTO: 12.5 FL (ref 9–12.9)
POTASSIUM SERPL-SCNC: 3.7 MMOL/L (ref 3.6–5.5)
PROT SERPL-MCNC: 7 G/DL (ref 6–8.2)
RBC # BLD AUTO: 4.59 M/UL (ref 4.2–5.4)
SODIUM SERPL-SCNC: 138 MMOL/L (ref 135–145)
TRIGL SERPL-MCNC: 129 MG/DL (ref 0–149)
WBC # BLD AUTO: 5.7 K/UL (ref 4.8–10.8)

## 2022-09-15 PROCEDURE — 85025 COMPLETE CBC W/AUTO DIFF WBC: CPT

## 2022-09-15 PROCEDURE — 80061 LIPID PANEL: CPT

## 2022-09-15 PROCEDURE — 83036 HEMOGLOBIN GLYCOSYLATED A1C: CPT

## 2022-09-15 PROCEDURE — 36415 COLL VENOUS BLD VENIPUNCTURE: CPT

## 2022-09-15 PROCEDURE — 82306 VITAMIN D 25 HYDROXY: CPT

## 2022-09-15 PROCEDURE — 80053 COMPREHEN METABOLIC PANEL: CPT

## 2023-02-23 ENCOUNTER — HOSPITAL ENCOUNTER (OUTPATIENT)
Dept: RADIOLOGY | Facility: MEDICAL CENTER | Age: 53
End: 2023-02-23
Attending: FAMILY MEDICINE
Payer: COMMERCIAL

## 2023-02-23 DIAGNOSIS — Z12.31 VISIT FOR SCREENING MAMMOGRAM: ICD-10-CM

## 2023-02-23 PROCEDURE — 77063 BREAST TOMOSYNTHESIS BI: CPT

## 2023-04-17 ENCOUNTER — DOCUMENTATION (OUTPATIENT)
Dept: HEALTH INFORMATION MANAGEMENT | Facility: OTHER | Age: 53
End: 2023-04-17
Payer: COMMERCIAL

## 2023-07-28 ENCOUNTER — HOSPITAL ENCOUNTER (OUTPATIENT)
Dept: LAB | Facility: MEDICAL CENTER | Age: 53
End: 2023-07-28
Attending: FAMILY MEDICINE
Payer: COMMERCIAL

## 2023-07-28 LAB
ALBUMIN SERPL BCP-MCNC: 4.1 G/DL (ref 3.2–4.9)
ALBUMIN/GLOB SERPL: 1.2 G/DL
ALP SERPL-CCNC: 57 U/L (ref 30–99)
ALT SERPL-CCNC: 13 U/L (ref 2–50)
ANION GAP SERPL CALC-SCNC: 9 MMOL/L (ref 7–16)
AST SERPL-CCNC: 14 U/L (ref 12–45)
BASOPHILS # BLD AUTO: 0.6 % (ref 0–1.8)
BASOPHILS # BLD: 0.03 K/UL (ref 0–0.12)
BILIRUB SERPL-MCNC: 0.3 MG/DL (ref 0.1–1.5)
BUN SERPL-MCNC: 13 MG/DL (ref 8–22)
CALCIUM ALBUM COR SERPL-MCNC: 9.2 MG/DL (ref 8.5–10.5)
CALCIUM SERPL-MCNC: 9.3 MG/DL (ref 8.5–10.5)
CHLORIDE SERPL-SCNC: 103 MMOL/L (ref 96–112)
CHOLEST SERPL-MCNC: 195 MG/DL (ref 100–199)
CO2 SERPL-SCNC: 26 MMOL/L (ref 20–33)
CREAT SERPL-MCNC: 0.72 MG/DL (ref 0.5–1.4)
EOSINOPHIL # BLD AUTO: 0.12 K/UL (ref 0–0.51)
EOSINOPHIL NFR BLD: 2.3 % (ref 0–6.9)
ERYTHROCYTE [DISTWIDTH] IN BLOOD BY AUTOMATED COUNT: 44.2 FL (ref 35.9–50)
EST. AVERAGE GLUCOSE BLD GHB EST-MCNC: 134 MG/DL
FASTING STATUS PATIENT QL REPORTED: NORMAL
GFR SERPLBLD CREATININE-BSD FMLA CKD-EPI: 99 ML/MIN/1.73 M 2
GLOBULIN SER CALC-MCNC: 3.3 G/DL (ref 1.9–3.5)
GLUCOSE SERPL-MCNC: 109 MG/DL (ref 65–99)
HBA1C MFR BLD: 6.3 % (ref 4–5.6)
HCT VFR BLD AUTO: 39.5 % (ref 37–47)
HDLC SERPL-MCNC: 36 MG/DL
HGB BLD-MCNC: 12.8 G/DL (ref 12–16)
IMM GRANULOCYTES # BLD AUTO: 0.01 K/UL (ref 0–0.11)
IMM GRANULOCYTES NFR BLD AUTO: 0.2 % (ref 0–0.9)
LDLC SERPL CALC-MCNC: 136 MG/DL
LYMPHOCYTES # BLD AUTO: 2.6 K/UL (ref 1–4.8)
LYMPHOCYTES NFR BLD: 50 % (ref 22–41)
MCH RBC QN AUTO: 28 PG (ref 27–33)
MCHC RBC AUTO-ENTMCNC: 32.4 G/DL (ref 32.2–35.5)
MCV RBC AUTO: 86.4 FL (ref 81.4–97.8)
MONOCYTES # BLD AUTO: 0.37 K/UL (ref 0–0.85)
MONOCYTES NFR BLD AUTO: 7.1 % (ref 0–13.4)
NEUTROPHILS # BLD AUTO: 2.07 K/UL (ref 1.82–7.42)
NEUTROPHILS NFR BLD: 39.8 % (ref 44–72)
NRBC # BLD AUTO: 0 K/UL
NRBC BLD-RTO: 0 /100 WBC (ref 0–0.2)
PLATELET # BLD AUTO: 271 K/UL (ref 164–446)
PMV BLD AUTO: 11.9 FL (ref 9–12.9)
POTASSIUM SERPL-SCNC: 3.7 MMOL/L (ref 3.6–5.5)
PROT SERPL-MCNC: 7.4 G/DL (ref 6–8.2)
RBC # BLD AUTO: 4.57 M/UL (ref 4.2–5.4)
SODIUM SERPL-SCNC: 138 MMOL/L (ref 135–145)
TRIGL SERPL-MCNC: 116 MG/DL (ref 0–149)
WBC # BLD AUTO: 5.2 K/UL (ref 4.8–10.8)

## 2023-07-28 PROCEDURE — 83036 HEMOGLOBIN GLYCOSYLATED A1C: CPT

## 2023-07-28 PROCEDURE — 80061 LIPID PANEL: CPT

## 2023-07-28 PROCEDURE — 80053 COMPREHEN METABOLIC PANEL: CPT

## 2023-07-28 PROCEDURE — 36415 COLL VENOUS BLD VENIPUNCTURE: CPT

## 2023-07-28 PROCEDURE — 85025 COMPLETE CBC W/AUTO DIFF WBC: CPT

## 2024-02-27 ENCOUNTER — HOSPITAL ENCOUNTER (OUTPATIENT)
Dept: RADIOLOGY | Facility: MEDICAL CENTER | Age: 54
End: 2024-02-27
Attending: FAMILY MEDICINE
Payer: COMMERCIAL

## 2024-02-27 DIAGNOSIS — Z12.31 VISIT FOR SCREENING MAMMOGRAM: ICD-10-CM

## 2024-02-27 PROCEDURE — 77067 SCR MAMMO BI INCL CAD: CPT

## 2024-08-13 ASSESSMENT — PATIENT HEALTH QUESTIONNAIRE - PHQ9
2. FEELING DOWN, DEPRESSED, IRRITABLE, OR HOPELESS: NOT AT ALL
1. LITTLE INTEREST OR PLEASURE IN DOING THINGS: NOT AT ALL

## 2024-08-13 ASSESSMENT — ACTIVITIES OF DAILY LIVING (ADL): BATHING_REQUIRES_ASSISTANCE: 0

## 2024-08-13 ASSESSMENT — ENCOUNTER SYMPTOMS: GENERAL WELL-BEING: GOOD

## 2024-08-14 ENCOUNTER — OFFICE VISIT (OUTPATIENT)
Dept: FAMILY PLANNING/WOMEN'S HEALTH CLINIC | Facility: PHYSICIAN GROUP | Age: 54
End: 2024-08-14
Attending: FAMILY MEDICINE
Payer: COMMERCIAL

## 2024-08-14 VITALS
BODY MASS INDEX: 38.6 KG/M2 | SYSTOLIC BLOOD PRESSURE: 120 MMHG | DIASTOLIC BLOOD PRESSURE: 82 MMHG | WEIGHT: 285 LBS | HEIGHT: 72 IN

## 2024-08-14 DIAGNOSIS — I10 ESSENTIAL HYPERTENSION: ICD-10-CM

## 2024-08-14 DIAGNOSIS — E78.00 ELEVATED LDL CHOLESTEROL LEVEL: ICD-10-CM

## 2024-08-14 DIAGNOSIS — E66.01 SEVERE OBESITY (BMI 35.0-39.9) WITH COMORBIDITY (HCC): ICD-10-CM

## 2024-08-14 DIAGNOSIS — F32.A DEPRESSION, UNSPECIFIED DEPRESSION TYPE: ICD-10-CM

## 2024-08-14 DIAGNOSIS — R12 HEARTBURN: ICD-10-CM

## 2024-08-14 PROCEDURE — 3074F SYST BP LT 130 MM HG: CPT

## 2024-08-14 PROCEDURE — 3079F DIAST BP 80-89 MM HG: CPT

## 2024-08-14 PROCEDURE — 1125F AMNT PAIN NOTED PAIN PRSNT: CPT

## 2024-08-14 PROCEDURE — G0439 PPPS, SUBSEQ VISIT: HCPCS

## 2024-08-14 RX ORDER — HYDROCHLOROTHIAZIDE 25 MG/1
25 TABLET ORAL DAILY
COMMUNITY

## 2024-08-14 SDOH — ECONOMIC STABILITY: FOOD INSECURITY: WITHIN THE PAST 12 MONTHS, YOU WORRIED THAT YOUR FOOD WOULD RUN OUT BEFORE YOU GOT MONEY TO BUY MORE.: NEVER TRUE

## 2024-08-14 SDOH — ECONOMIC STABILITY: HOUSING INSECURITY: IN THE PAST 12 MONTHS, HOW MANY TIMES HAVE YOU MOVED WHERE YOU WERE LIVING?: 1

## 2024-08-14 SDOH — ECONOMIC STABILITY: HOUSING INSECURITY: AT ANY TIME IN THE PAST 12 MONTHS, WERE YOU HOMELESS OR LIVING IN A SHELTER (INCLUDING NOW)?: NO

## 2024-08-14 SDOH — ECONOMIC STABILITY: INCOME INSECURITY: IN THE LAST 12 MONTHS, WAS THERE A TIME WHEN YOU WERE NOT ABLE TO PAY THE MORTGAGE OR RENT ON TIME?: NO

## 2024-08-14 SDOH — ECONOMIC STABILITY: FOOD INSECURITY: WITHIN THE PAST 12 MONTHS, THE FOOD YOU BOUGHT JUST DIDN'T LAST AND YOU DIDN'T HAVE MONEY TO GET MORE.: NEVER TRUE

## 2024-08-14 SDOH — ECONOMIC STABILITY: INCOME INSECURITY: HOW HARD IS IT FOR YOU TO PAY FOR THE VERY BASICS LIKE FOOD, HOUSING, MEDICAL CARE, AND HEATING?: NOT HARD AT ALL

## 2024-08-14 SDOH — ECONOMIC STABILITY: TRANSPORTATION INSECURITY
IN THE PAST 12 MONTHS, HAS THE LACK OF TRANSPORTATION KEPT YOU FROM MEDICAL APPOINTMENTS OR FROM GETTING MEDICATIONS?: NO

## 2024-08-14 SDOH — ECONOMIC STABILITY: TRANSPORTATION INSECURITY
IN THE PAST 12 MONTHS, HAS LACK OF TRANSPORTATION KEPT YOU FROM MEETINGS, WORK, OR FROM GETTING THINGS NEEDED FOR DAILY LIVING?: NO

## 2024-08-14 ASSESSMENT — PATIENT HEALTH QUESTIONNAIRE - PHQ9: CLINICAL INTERPRETATION OF PHQ2 SCORE: 0

## 2024-08-14 ASSESSMENT — PAIN SCALES - GENERAL: PAINLEVEL: 1=MINIMAL PAIN

## 2024-08-14 ASSESSMENT — FIBROSIS 4 INDEX: FIB4 SCORE: 0.79

## 2024-08-14 NOTE — ASSESSMENT & PLAN NOTE
Chronic, stable.  The patient tries to get up regularly.  Discussed eating a diet that contains many vegetables, fruits, and whole grains; includes low-fat dairy products, poultry, fish, beans, non-tropical vegetable oils and nuts; and limit intake of sweets, sugar-sweetened drinks and red meats.  Discussed increasing activity such as walking as tolerated.    Follow-up at least annually.

## 2024-08-14 NOTE — ASSESSMENT & PLAN NOTE
Chronic, stable. Continue with current defined treatment plan: vitamin D2, Ergocalciferol, (DRISDOL) 1.25 MG (13346 UT) Cap capsule. Follow-up at least annually.

## 2024-08-14 NOTE — PROGRESS NOTES
Comprehensive Health Assessment Program     Luis Paredes is a 54 y.o. here for her comprehensive health assessment.    Patient Active Problem List    Diagnosis Date Noted    Vitamin D deficiency 12/15/2021    Elevated LDL cholesterol level 12/15/2021    Essential hypertension 02/17/2021    Heartburn 02/17/2021    Depression 02/17/2021    Obesity (BMI 35.0-39.9 without comorbidity) 02/17/2021       Current Outpatient Medications   Medication Sig Dispense Refill    hydroCHLOROthiazide 25 MG Tab Take 25 mg by mouth every day.      Calcium Carbonate (CALCIUM 600) 600 MG Tab Take  by mouth.      Magnesium Gluconate (MAGNESIUM 27 PO) Take  by mouth.      telmisartan-hydrochlorothiazide (MICARDIS HCT) 40-12.5 MG per tablet Take 2 Tablets by mouth every day. 180 Tablet 2    FLUoxetine (PROZAC) 20 MG Cap Take 1 Capsule by mouth every day. 90 Capsule 3    OMEPRAZOLE PO Take 15 mg by mouth.      Evening Primrose Oil 1000 MG Cap Take  by mouth.      vitamin D2, Ergocalciferol, (DRISDOL) 1.25 MG (26573 UT) Cap capsule Take 1 Capsule by mouth every 7 days. 12 Capsule 3    Zinc 50 MG Cap Take 50 mg by mouth every day.       No current facility-administered medications for this visit.          Current supplements as per medication list.     Allergies:   Penicillins  Social History     Tobacco Use    Smoking status: Never    Smokeless tobacco: Never   Vaping Use    Vaping status: Never Used   Substance Use Topics    Alcohol use: Yes     Comment: Socially     Drug use: Never     Family History   Problem Relation Age of Onset    Heart Disease Mother         Heart Attack    Cancer Paternal Grandfather         Pancreatic    Diabetes Neg Hx     Stroke Neg Hx      Luis  has a past medical history of Hypertension.   Past Surgical History:   Procedure Laterality Date    ABDOMINAL HYSTERECTOMY TOTAL  2010       Screening:  In the last six months have you experienced any leakage of urine? Yes    Depression  Screening  Little interest or pleasure in doing things?  0 - not at all  Feeling down, depressed , or hopeless? 0 - not at all  Patient Health Questionnaire Score: 0     If depressive symptoms identified deferred to follow up visit unless specifically addressed in assessment and plan.    Interpretation of PHQ-9 Total Score   Score Severity   1-4 No Depression   5-9 Mild Depression   10-14 Moderate Depression   15-19 Moderately Severe Depression   20-27 Severe Depression    Screening for Cognitive Impairment  Do you or any of your friends or family members have any concern about your memory? No  Three Minute Recall (Leader, Season, Table)  /3    Kahlil clock face with all 12 numbers and set the hands to show 10 minutes after 11.       Cognitive concerns identified deferred for follow up unless specifically addressed in assessment and plan.    Fall Risk Assessment  Has the patient had two or more falls in the last year or any fall with injury in the last year?  No    Safety Assessment  Do you always wear your seatbelt?  Yes  Any changes to home needed to function safely? No  Difficulty hearing.  No  Patient counseled about all safety risks that were identified.    Functional Assessment ADLs  Are there any barriers preventing you from cooking for yourself or meeting nutritional needs?  No.    Are there any barriers preventing you from driving safely or obtaining transportation?  No.    Are there any barriers preventing you from using a telephone or calling for help?  No    Are there any barriers preventing you from shopping?  No.    Are there any barriers preventing you from taking care of your own finances?  No    Are there any barriers preventing you from managing your medications?  No    Are there any barriers preventing you from showering, bathing or dressing yourself? No    Are there any barriers preventing you from doing housework or laundry? No  Are there any barriers preventing you from using the toilet?No  Are  you currently engaging in any exercise or physical activity?  No.      Self-Assessment of Health  What is your perception of your health? Good    Do you sleep more than six hours a night? Yes    In the past 7 days, how much did pain keep you from doing your normal work? None    Do you spend quality time with family or friends (virtually or in person)? Yes    Do you usually eat a heart healthy diet that constists of a variety of fruits, vegetables, whole grains and fiber? Yes    Do you eat foods high in fat and/or Fast Food more than three times per week? No    How concerned are you that your medical conditions are not being well managed? Not at all    Are you worried that in the next 2 months, you may not have stable housing that you own, rent, or stay in as part of a household? No      Advance Care Planning  Do you have an Advance Directive, Living Will, Durable Power of , or POLST? No                 Health Maintenance Summary            Overdue - HIV Screening (Once) Never done      No completion history exists for this topic.              Overdue - Hepatitis C Screening (Once) Never done      No completion history exists for this topic.              Overdue - Polio Vaccine (Inactivated Polio) (2 of 3 - 4-dose series) Overdue since 10/10/1973      09/12/1973  Imm Admin: OPV TRIVALENT - HISTORICAL DATA (GIVEN PRIOR TO MAY 2016)              Overdue - Hepatitis B Vaccine (Hep B) (3 of 3 - 19+ 3-dose series) Overdue since 7/19/2016 02/22/2016  Imm Admin: Hepatitis B Vaccine (Adol/Adult)    01/19/2016  Imm Admin: Hepatitis B Vaccine (Adol/Adult)              Overdue - Colorectal Cancer Screening (Colonoscopy - Every 3 Years) Order placed this encounter      04/09/2021  REFERRAL TO GI FOR COLONOSCOPY              Influenza Vaccine (1) Next due on 9/1/2024 11/26/2023  Outside Immunization: Influenza Quad Inj P    10/21/2022  Patient Reported Immunization: Influenza, Unspecified - HISTORICAL DATA     12/15/2021  Imm Admin: Influenza Vaccine Quad Inj (Pf)    10/23/2020  Imm Admin: Influenza Vac Subunit Quad Inj (Pf)    10/16/2019  Imm Admin: Influenza Vaccine Quad Inj (Pf)    Only the first 5 history entries have been loaded, but more history exists.              Mammogram (Every 2 Years) Tentatively due on 2/27/2026 02/27/2024  MA-SCREENING MAMMO BILAT W/TOMOSYNTHESIS W/CAD    02/23/2023  MA-SCREENING MAMMO BILAT W/TOMOSYNTHESIS W/CAD    01/24/2022  MA-SCREENING MAMMO BILAT W/TOMOSYNTHESIS W/CAD    10/29/2020  MA-SCREENING MAMMO BILAT W/TOMOSYNTHESIS W/CAD              IMM DTaP/Tdap/Td Vaccine (3 - Td or Tdap) Next due on 12/27/2033 12/27/2023  Outside Immunization: Tdap    09/12/1974  Imm Admin: Dtap Vaccine              COVID-19 Vaccine (Series Information) Completed      12/27/2023  Imm Admin: Comirnaty (Covid-19 Vaccine, Mrna, [id number] Formula)    09/09/2021  Imm Admin: PFIZER PURPLE CAP SARS-COV-2 VACCINATION (12+)    04/26/2021  Imm Admin: PFIZER PURPLE CAP SARS-COV-2 VACCINATION (12+)    04/05/2021  Imm Admin: PFIZER PURPLE CAP SARS-COV-2 VACCINATION (12+)              Zoster (Shingles) Vaccines (Series Information) Completed      05/22/2024  Outside Immunization: Zoster Toni (Shingrix)    11/26/2023  Outside Immunization: Zoster Toni (Shingrix)              Hepatitis A Vaccine (Hep A) (Series Information) Aged Out      No completion history exists for this topic.              HPV Vaccines (Series Information) Aged Out      No completion history exists for this topic.              Meningococcal Immunization (Series Information) Aged Out      No completion history exists for this topic.              Pneumococcal Vaccine: 0-64 Years (Series Information) Aged Out      No completion history exists for this topic.              Discontinued - Cervical Cancer Screening  Discontinued        Frequency changed to Never automatically (Topic No Longer Applies)    08/19/2022  THINPREP PAP WITH HPV     "08/19/2022  Pathology Gynecology Specimen                    Patient Care Team:  Leatha Guzman M.D. as PCP - General (Family Medicine)      Financial Resource Strain: Low Risk  (8/14/2024)    Overall Financial Resource Strain (CARDIA)     Difficulty of Paying Living Expenses: Not hard at all      Transportation Needs: No Transportation Needs (8/14/2024)    PRAPARE - Transportation     Lack of Transportation (Medical): No     Lack of Transportation (Non-Medical): No      Food Insecurity: No Food Insecurity (8/14/2024)    Hunger Vital Sign     Worried About Running Out of Food in the Last Year: Never true     Ran Out of Food in the Last Year: Never true        Encounter Vitals  Blood Pressure: 120/82  O2 Delivery Device: None - Room Air  Weight: (!) 129 kg (285 lb)  Height: 186.7 cm (6' 1.5\")  BMI (Calculated): 37.09  Pain Score: 1=Minimal Pain  DME  O2 Delivery Device: None - Room Air     Alert, oriented in no acute distress.  Eye contact is good, speech goal directed, affect calm.    Assessment and Plan. The following treatment and monitoring plan is recommended:  No problem-specific Assessment & Plan notes found for this encounter.      Services suggested: No services needed at this time  Health Care Screening: Age-appropriate preventive services recommended by USPTF and ACIP covered by Medicare were discussed today. Services ordered if indicated and agreed upon by the patient.  Referrals offered: Community-based lifestyle interventions to reduce health risks and promote self-management and wellness, fall prevention, nutrition, physical activity, tobacco-use cessation, weight loss, and mental health services as per orders if indicated.    Discussion today about general wellness and lifestyle habits:    Prevent falls and reduce trip hazards; Cautioned about securing or removing rugs.  Have a working fire alarm and carbon monoxide detector.  Engage in regular physical activity and social activities.    Follow-up: " No follow-ups on file.

## 2024-08-14 NOTE — ASSESSMENT & PLAN NOTE
Chronic, stable.  PHQ-9 score today is 0. The patient reports good mood most of the time.  She has good family and J[employer] support. She denies SI/HI. Not currently in counseling.   Continue with current defined treatment plan: FLUoxetine (PROZAC) 20 MG Cap. Follow-up at least annually.

## 2024-08-14 NOTE — ASSESSMENT & PLAN NOTE
Chronic, stable. No current treatment. Discussed heart healthy diet and exercise.  Follow-up at least annually.

## 2024-08-14 NOTE — PROGRESS NOTES
Comprehensive Health Assessment Program     Luis Paredes is a 54 y.o. here for her comprehensive health assessment.    Patient Active Problem List    Diagnosis Date Noted    Vitamin D deficiency 12/15/2021    Elevated LDL cholesterol level 12/15/2021    Essential hypertension 02/17/2021    Heartburn 02/17/2021    Depression 02/17/2021    Severe obesity (BMI 35.0-39.9) with comorbidity (HCC) 02/17/2021       Current Outpatient Medications   Medication Sig Dispense Refill    hydroCHLOROthiazide 25 MG Tab Take 25 mg by mouth every day.      TELMISARTAN PO Take  by mouth.      Calcium Carbonate (CALCIUM 600) 600 MG Tab Take  by mouth.      Magnesium Gluconate (MAGNESIUM 27 PO) Take  by mouth.      FLUoxetine (PROZAC) 20 MG Cap Take 1 Capsule by mouth every day. 90 Capsule 3    OMEPRAZOLE PO Take 15 mg by mouth.      Evening Primrose Oil 1000 MG Cap Take  by mouth.      telmisartan-hydrochlorothiazide (MICARDIS HCT) 40-12.5 MG per tablet Take 2 Tablets by mouth every day. (Patient not taking: Reported on 8/14/2024) 180 Tablet 2    vitamin D2, Ergocalciferol, (DRISDOL) 1.25 MG (73862 UT) Cap capsule Take 1 Capsule by mouth every 7 days. 12 Capsule 3     No current facility-administered medications for this visit.          Current supplements as per medication list.     Allergies:   Penicillins  Social History     Tobacco Use    Smoking status: Never    Smokeless tobacco: Never   Vaping Use    Vaping status: Never Used   Substance Use Topics    Alcohol use: Yes     Comment: Socially     Drug use: Never     Family History   Problem Relation Age of Onset    Heart Disease Mother         Heart Attack    Cancer Paternal Grandfather         Pancreatic    Diabetes Neg Hx     Stroke Neg Hx      Luis  has a past medical history of Hypertension.   Past Surgical History:   Procedure Laterality Date    ABDOMINAL HYSTERECTOMY TOTAL  2010         Depression Screening  Little interest or pleasure in doing things?   0 - not at all  Feeling down, depressed , or hopeless? 0 - not at all  Patient Health Questionnaire Score: 0     If depressive symptoms identified deferred to follow up visit unless specifically addressed in assessment and plan.    Interpretation of PHQ-9 Total Score   Score Severity   1-4 No Depression   5-9 Mild Depression   10-14 Moderate Depression   15-19 Moderately Severe Depression   20-27 Severe Depression      Fall Risk Assessment  Has the patient had two or more falls in the last year or any fall with injury in the last year?  No    Safety Assessment  Do you always wear your seatbelt?  Yes  Any changes to home needed to function safely? No  Difficulty hearing.  No  Patient counseled about all safety risks that were identified.    Functional Assessment ADLs  Are there any barriers preventing you from cooking for yourself or meeting nutritional needs?  No.    Are there any barriers preventing you from driving safely or obtaining transportation?  No.    Are there any barriers preventing you from using a telephone or calling for help?  No    Are there any barriers preventing you from shopping?  No.    Are there any barriers preventing you from taking care of your own finances?  No    Are there any barriers preventing you from managing your medications?  No    Are there any barriers preventing you from showering, bathing or dressing yourself? No    Are there any barriers preventing you from doing housework or laundry? No  Are there any barriers preventing you from using the toilet?No  Are you currently engaging in any exercise or physical activity?  No.      Self-Assessment of Health  What is your perception of your health? Good    Do you sleep more than six hours a night? Yes    In the past 7 days, how much did pain keep you from doing your normal work? None    Do you spend quality time with family or friends (virtually or in person)? Yes    Do you usually eat a heart healthy diet that constists of a variety  of fruits, vegetables, whole grains and fiber? Yes    Do you eat foods high in fat and/or Fast Food more than three times per week? No    How concerned are you that your medical conditions are not being well managed? Not at all    Are you worried that in the next 2 months, you may not have stable housing that you own, rent, or stay in as part of a household? No      Advance Care Planning  Do you have an Advance Directive, Living Will, Durable Power of , or POLST? No                 Health Maintenance Summary            Overdue - HIV Screening (Once) Never done      No completion history exists for this topic.              Overdue - Hepatitis C Screening (Once) Never done      No completion history exists for this topic.              Overdue - Polio Vaccine (Inactivated Polio) (2 of 3 - 4-dose series) Overdue since 10/10/1973      09/12/1973  Imm Admin: OPV TRIVALENT - HISTORICAL DATA (GIVEN PRIOR TO MAY 2016)              Overdue - Hepatitis B Vaccine (Hep B) (3 of 3 - 19+ 3-dose series) Overdue since 7/19/2016 02/22/2016  Imm Admin: Hepatitis B Vaccine (Adol/Adult)    01/19/2016  Imm Admin: Hepatitis B Vaccine (Adol/Adult)              Overdue - Colorectal Cancer Screening (Colonoscopy - Every 3 Years) Overdue since 4/9/2024 04/09/2021  REFERRAL TO GI FOR COLONOSCOPY              Influenza Vaccine (1) Next due on 9/1/2024 11/26/2023  Outside Immunization: Influenza Quad Inj P    10/21/2022  Patient Reported Immunization: Influenza, Unspecified - HISTORICAL DATA    12/15/2021  Imm Admin: Influenza Vaccine Quad Inj (Pf)    10/23/2020  Imm Admin: Influenza Vac Subunit Quad Inj (Pf)    10/16/2019  Imm Admin: Influenza Vaccine Quad Inj (Pf)    Only the first 5 history entries have been loaded, but more history exists.              Mammogram (Every 2 Years) Tentatively due on 2/27/2026 02/27/2024  MA-SCREENING MAMMO BILAT W/TOMOSYNTHESIS W/CAD    02/23/2023  MA-SCREENING MAMMO BILAT  W/TOMOSYNTHESIS W/CAD    01/24/2022  MA-SCREENING MAMMO BILAT W/TOMOSYNTHESIS W/CAD    10/29/2020  MA-SCREENING MAMMO BILAT W/TOMOSYNTHESIS W/CAD              IMM DTaP/Tdap/Td Vaccine (3 - Td or Tdap) Next due on 12/27/2033 12/27/2023  Outside Immunization: Tdap    09/12/1973  Imm Admin: Dtap Vaccine              COVID-19 Vaccine (Series Information) Completed      12/27/2023  Imm Admin: Comirnaty (Covid-19 Vaccine, Mrna, 1585-1027 Formula)    09/09/2021  Imm Admin: PFIZER PURPLE CAP SARS-COV-2 VACCINATION (12+)    04/26/2021  Imm Admin: PFIZER PURPLE CAP SARS-COV-2 VACCINATION (12+)    04/05/2021  Imm Admin: PFIZER PURPLE CAP SARS-COV-2 VACCINATION (12+)              Zoster (Shingles) Vaccines (Series Information) Completed      05/22/2024  Outside Immunization: Zoster Toni (Shingrix)    11/26/2023  Outside Immunization: Zoster Toni (Shingrix)              Hepatitis A Vaccine (Hep A) (Series Information) Aged Out      No completion history exists for this topic.              HPV Vaccines (Series Information) Aged Out      No completion history exists for this topic.              Meningococcal Immunization (Series Information) Aged Out      No completion history exists for this topic.              Pneumococcal Vaccine: 0-64 Years (Series Information) Aged Out      No completion history exists for this topic.              Discontinued - Cervical Cancer Screening  Discontinued        Frequency changed to Never automatically (Topic No Longer Applies)    08/19/2022  THINPREP PAP WITH HPV    08/19/2022  Pathology Gynecology Specimen                    Patient Care Team:  Leatha Guzman M.D. as PCP - General (Family Medicine)      Financial Resource Strain: Low Risk  (8/14/2024)    Overall Financial Resource Strain (CARDIA)     Difficulty of Paying Living Expenses: Not hard at all      Transportation Needs: No Transportation Needs (8/14/2024)    PRAPARE - Transportation     Lack of Transportation (Medical): No      "Lack of Transportation (Non-Medical): No      Food Insecurity: No Food Insecurity (8/14/2024)    Hunger Vital Sign     Worried About Running Out of Food in the Last Year: Never true     Ran Out of Food in the Last Year: Never true     Intimate Partner Violence: Not on file         Encounter Vitals  Blood Pressure: 120/82  O2 Delivery Device: None - Room Air  Weight: (!) 129 kg (285 lb)  Height: 186.7 cm (6' 1.5\")  BMI (Calculated): 37.09  Pain Score: 1=Minimal Pain  DME  O2 Delivery Device: None - Room Air     Alert, oriented in no acute distress.  Eye contact is good, speech goal directed, affect calm.    Assessment and Plan. The following treatment and monitoring plan is recommended:  Essential hypertension  Chronic, stable. The patient's blood pressure is well controlled with current medication. Continue with current defined treatment plan: TELMISARTAN PO, hydroCHLOROthiazide 25 MG Tab. Follow-up at least annually.      Heartburn  Chronic, stable. The patient reports that her symptoms are well controlled with current medication. Continue with current defined treatment plan: Omeprazole (PRILOSEC PO) . Follow-up at least annually.      Elevated LDL cholesterol level  Chronic, stable. No current treatment. Discussed heart healthy diet and exercise.  Follow-up at least annually.      Severe obesity (BMI 35.0-39.9) with comorbidity (HCC)  Chronic, stable.  The patient tries to get up regularly.  Discussed eating a diet that contains many vegetables, fruits, and whole grains; includes low-fat dairy products, poultry, fish, beans, non-tropical vegetable oils and nuts; and limit intake of sweets, sugar-sweetened drinks and red meats.  Discussed increasing activity such as walking as tolerated.    Follow-up at least annually.        Vitamin D deficiency  Chronic, stable. Continue with current defined treatment plan: vitamin D2, Ergocalciferol, (DRISDOL) 1.25 MG (92817 UT) Cap capsule. Follow-up at least " annually.      Depression  Chronic, stable.  PHQ-9 score today is 0. The patient reports good mood most of the time.  She has good family and [employer] support. She denies SI/HI. Not currently in counseling.   Continue with current defined treatment plan: FLUoxetine (PROZAC) 20 MG Cap. Follow-up at least annually.      Services suggested: No services needed at this time    Follow-up: Return for appointment with Primary Care Provider as needed.

## 2024-08-14 NOTE — ASSESSMENT & PLAN NOTE
Chronic, stable. The patient's blood pressure is well controlled with current medication. Continue with current defined treatment plan: TELMISARTAN PO, hydroCHLOROthiazide 25 MG Tab. Follow-up at least annually.

## 2024-08-28 ENCOUNTER — HOSPITAL ENCOUNTER (OUTPATIENT)
Dept: LAB | Facility: MEDICAL CENTER | Age: 54
End: 2024-08-28
Attending: FAMILY MEDICINE
Payer: COMMERCIAL

## 2024-08-28 LAB
ALBUMIN SERPL BCP-MCNC: 4.1 G/DL (ref 3.2–4.9)
ALBUMIN/GLOB SERPL: 1.3 G/DL
ALP SERPL-CCNC: 64 U/L (ref 30–99)
ALT SERPL-CCNC: 13 U/L (ref 2–50)
ANION GAP SERPL CALC-SCNC: 12 MMOL/L (ref 7–16)
AST SERPL-CCNC: 21 U/L (ref 12–45)
BASOPHILS # BLD AUTO: 0.7 % (ref 0–1.8)
BASOPHILS # BLD: 0.04 K/UL (ref 0–0.12)
BILIRUB SERPL-MCNC: <0.2 MG/DL (ref 0.1–1.5)
BUN SERPL-MCNC: 15 MG/DL (ref 8–22)
CALCIUM ALBUM COR SERPL-MCNC: 9.7 MG/DL (ref 8.5–10.5)
CALCIUM SERPL-MCNC: 9.8 MG/DL (ref 8.5–10.5)
CHLORIDE SERPL-SCNC: 103 MMOL/L (ref 96–112)
CHOLEST SERPL-MCNC: 194 MG/DL (ref 100–199)
CO2 SERPL-SCNC: 24 MMOL/L (ref 20–33)
CREAT SERPL-MCNC: 0.73 MG/DL (ref 0.5–1.4)
EOSINOPHIL # BLD AUTO: 0.15 K/UL (ref 0–0.51)
EOSINOPHIL NFR BLD: 2.6 % (ref 0–6.9)
ERYTHROCYTE [DISTWIDTH] IN BLOOD BY AUTOMATED COUNT: 46.3 FL (ref 35.9–50)
EST. AVERAGE GLUCOSE BLD GHB EST-MCNC: 126 MG/DL
GFR SERPLBLD CREATININE-BSD FMLA CKD-EPI: 97 ML/MIN/1.73 M 2
GLOBULIN SER CALC-MCNC: 3.1 G/DL (ref 1.9–3.5)
GLUCOSE SERPL-MCNC: 95 MG/DL (ref 65–99)
HBA1C MFR BLD: 6 % (ref 4–5.6)
HCT VFR BLD AUTO: 41.6 % (ref 37–47)
HDLC SERPL-MCNC: 40 MG/DL
HGB BLD-MCNC: 13.4 G/DL (ref 12–16)
IMM GRANULOCYTES # BLD AUTO: 0.01 K/UL (ref 0–0.11)
IMM GRANULOCYTES NFR BLD AUTO: 0.2 % (ref 0–0.9)
LDLC SERPL CALC-MCNC: 129 MG/DL
LYMPHOCYTES # BLD AUTO: 2.66 K/UL (ref 1–4.8)
LYMPHOCYTES NFR BLD: 46.3 % (ref 22–41)
MCH RBC QN AUTO: 28.5 PG (ref 27–33)
MCHC RBC AUTO-ENTMCNC: 32.2 G/DL (ref 32.2–35.5)
MCV RBC AUTO: 88.5 FL (ref 81.4–97.8)
MONOCYTES # BLD AUTO: 0.43 K/UL (ref 0–0.85)
MONOCYTES NFR BLD AUTO: 7.5 % (ref 0–13.4)
NEUTROPHILS # BLD AUTO: 2.46 K/UL (ref 1.82–7.42)
NEUTROPHILS NFR BLD: 42.7 % (ref 44–72)
NRBC # BLD AUTO: 0 K/UL
NRBC BLD-RTO: 0 /100 WBC (ref 0–0.2)
PLATELET # BLD AUTO: 329 K/UL (ref 164–446)
PMV BLD AUTO: 12.6 FL (ref 9–12.9)
POTASSIUM SERPL-SCNC: 3.9 MMOL/L (ref 3.6–5.5)
PROT SERPL-MCNC: 7.2 G/DL (ref 6–8.2)
RBC # BLD AUTO: 4.7 M/UL (ref 4.2–5.4)
SODIUM SERPL-SCNC: 139 MMOL/L (ref 135–145)
TRIGL SERPL-MCNC: 125 MG/DL (ref 0–149)
WBC # BLD AUTO: 5.8 K/UL (ref 4.8–10.8)

## 2024-08-28 PROCEDURE — 83036 HEMOGLOBIN GLYCOSYLATED A1C: CPT

## 2024-08-28 PROCEDURE — 85025 COMPLETE CBC W/AUTO DIFF WBC: CPT

## 2024-08-28 PROCEDURE — 80061 LIPID PANEL: CPT

## 2024-08-28 PROCEDURE — 80053 COMPREHEN METABOLIC PANEL: CPT

## 2024-08-28 PROCEDURE — 36415 COLL VENOUS BLD VENIPUNCTURE: CPT
